# Patient Record
Sex: FEMALE | Race: BLACK OR AFRICAN AMERICAN | NOT HISPANIC OR LATINO | Employment: FULL TIME | URBAN - METROPOLITAN AREA
[De-identification: names, ages, dates, MRNs, and addresses within clinical notes are randomized per-mention and may not be internally consistent; named-entity substitution may affect disease eponyms.]

---

## 2018-02-19 PROBLEM — N18.30 STAGE 3 CHRONIC KIDNEY DISEASE: Status: ACTIVE | Noted: 2018-02-19

## 2018-02-19 PROBLEM — F31.70 BIPOLAR AFFECTIVE DISORDER IN REMISSION: Status: ACTIVE | Noted: 2018-02-19

## 2018-02-19 PROBLEM — E88.810 DYSMETABOLIC SYNDROME X: Status: ACTIVE | Noted: 2018-02-19

## 2018-02-19 PROBLEM — E78.49 OTHER HYPERLIPIDEMIA: Status: ACTIVE | Noted: 2018-02-19

## 2018-02-19 PROBLEM — E55.9 VITAMIN D DEFICIENCY DISEASE: Status: ACTIVE | Noted: 2018-02-19

## 2018-09-21 PROBLEM — R10.13 EPIGASTRIC PAIN: Status: ACTIVE | Noted: 2018-09-21

## 2020-07-21 PROBLEM — I10 ESSENTIAL HYPERTENSION: Status: ACTIVE | Noted: 2020-07-21

## 2020-10-28 PROBLEM — R94.31 ABNORMAL EKG: Status: ACTIVE | Noted: 2020-10-28

## 2021-04-29 ENCOUNTER — PATIENT MESSAGE (OUTPATIENT)
Dept: RESEARCH | Facility: HOSPITAL | Age: 46
End: 2021-04-29

## 2022-06-08 ENCOUNTER — TELEPHONE (OUTPATIENT)
Dept: PREADMISSION TESTING | Facility: HOSPITAL | Age: 47
End: 2022-06-08
Payer: COMMERCIAL

## 2022-06-08 NOTE — TELEPHONE ENCOUNTER
PAT call completed.  Patient educated on procedure instructions.  Medical history discussed and patient informed of arrival time of 9:00 AM on Saturday, June 11, 2022 at the [place], and was made aware of the limited-visitor policy, and that  is to remain during the entire visit.  All questions and concerns addressed.  Endoscopy instructions reviewed. Instructed no solid food, only clear liquids, the day before the procedure, then at 6 PM, the day before the procedure, drink the first half of the bowel prep, then at 2 AM, the morning of procedure, drink the second half of the prep, then do not eat or drink anything until after the procedure.   Pre-procedure covid testing not needed, patient is covid vaccinated. Patient verbalized understanding of all instructions.

## 2022-06-16 DIAGNOSIS — Z12.11 COLON CANCER SCREENING: Primary | ICD-10-CM

## 2022-12-09 PROBLEM — R73.02 IGT (IMPAIRED GLUCOSE TOLERANCE): Status: ACTIVE | Noted: 2022-12-09

## 2022-12-27 DIAGNOSIS — I10 ESSENTIAL HYPERTENSION: Primary | ICD-10-CM

## 2022-12-27 DIAGNOSIS — E78.49 OTHER HYPERLIPIDEMIA: ICD-10-CM

## 2022-12-27 DIAGNOSIS — R94.31 ABNORMAL EKG: ICD-10-CM

## 2022-12-28 ENCOUNTER — HOSPITAL ENCOUNTER (OUTPATIENT)
Dept: CARDIOLOGY | Facility: HOSPITAL | Age: 47
Discharge: HOME | End: 2022-12-28
Attending: STUDENT IN AN ORGANIZED HEALTH CARE EDUCATION/TRAINING PROGRAM
Payer: COMMERCIAL

## 2022-12-28 ENCOUNTER — OFFICE VISIT (OUTPATIENT)
Dept: CARDIOLOGY | Facility: CLINIC | Age: 47
End: 2022-12-28
Payer: COMMERCIAL

## 2022-12-28 VITALS
HEART RATE: 70 BPM | SYSTOLIC BLOOD PRESSURE: 133 MMHG | DIASTOLIC BLOOD PRESSURE: 90 MMHG | OXYGEN SATURATION: 97 % | WEIGHT: 157.63 LBS | BODY MASS INDEX: 29.01 KG/M2 | HEIGHT: 62 IN

## 2022-12-28 DIAGNOSIS — E78.49 OTHER HYPERLIPIDEMIA: ICD-10-CM

## 2022-12-28 DIAGNOSIS — F31.70 BIPOLAR AFFECTIVE DISORDER IN REMISSION: Primary | ICD-10-CM

## 2022-12-28 DIAGNOSIS — I10 ESSENTIAL HYPERTENSION: ICD-10-CM

## 2022-12-28 DIAGNOSIS — R94.31 ABNORMAL EKG: ICD-10-CM

## 2022-12-28 DIAGNOSIS — R94.31 NONSPECIFIC ABNORMAL ELECTROCARDIOGRAM (ECG) (EKG): ICD-10-CM

## 2022-12-28 DIAGNOSIS — E88.810 DYSMETABOLIC SYNDROME X: ICD-10-CM

## 2022-12-28 DIAGNOSIS — N18.31 STAGE 3A CHRONIC KIDNEY DISEASE: ICD-10-CM

## 2022-12-28 DIAGNOSIS — E55.9 VITAMIN D DEFICIENCY DISEASE: ICD-10-CM

## 2022-12-28 PROCEDURE — 3075F SYST BP GE 130 - 139MM HG: CPT | Mod: CPTII,S$GLB,, | Performed by: STUDENT IN AN ORGANIZED HEALTH CARE EDUCATION/TRAINING PROGRAM

## 2022-12-28 PROCEDURE — 99999 PR PBB SHADOW E&M-EST. PATIENT-LVL IV: CPT | Mod: PBBFAC,,, | Performed by: STUDENT IN AN ORGANIZED HEALTH CARE EDUCATION/TRAINING PROGRAM

## 2022-12-28 PROCEDURE — 99204 PR OFFICE/OUTPT VISIT, NEW, LEVL IV, 45-59 MIN: ICD-10-PCS | Mod: S$GLB,,, | Performed by: STUDENT IN AN ORGANIZED HEALTH CARE EDUCATION/TRAINING PROGRAM

## 2022-12-28 PROCEDURE — 99204 OFFICE O/P NEW MOD 45 MIN: CPT | Mod: S$GLB,,, | Performed by: STUDENT IN AN ORGANIZED HEALTH CARE EDUCATION/TRAINING PROGRAM

## 2022-12-28 PROCEDURE — 3080F DIAST BP >= 90 MM HG: CPT | Mod: CPTII,S$GLB,, | Performed by: STUDENT IN AN ORGANIZED HEALTH CARE EDUCATION/TRAINING PROGRAM

## 2022-12-28 PROCEDURE — 93010 ELECTROCARDIOGRAM REPORT: CPT | Mod: ,,, | Performed by: INTERNAL MEDICINE

## 2022-12-28 PROCEDURE — 3044F PR MOST RECENT HEMOGLOBIN A1C LEVEL <7.0%: ICD-10-PCS | Mod: CPTII,S$GLB,, | Performed by: STUDENT IN AN ORGANIZED HEALTH CARE EDUCATION/TRAINING PROGRAM

## 2022-12-28 PROCEDURE — 3066F NEPHROPATHY DOC TX: CPT | Mod: CPTII,S$GLB,, | Performed by: STUDENT IN AN ORGANIZED HEALTH CARE EDUCATION/TRAINING PROGRAM

## 2022-12-28 PROCEDURE — 3008F BODY MASS INDEX DOCD: CPT | Mod: CPTII,S$GLB,, | Performed by: STUDENT IN AN ORGANIZED HEALTH CARE EDUCATION/TRAINING PROGRAM

## 2022-12-28 PROCEDURE — 99999 PR PBB SHADOW E&M-EST. PATIENT-LVL IV: ICD-10-PCS | Mod: PBBFAC,,, | Performed by: STUDENT IN AN ORGANIZED HEALTH CARE EDUCATION/TRAINING PROGRAM

## 2022-12-28 PROCEDURE — 3075F PR MOST RECENT SYSTOLIC BLOOD PRESS GE 130-139MM HG: ICD-10-PCS | Mod: CPTII,S$GLB,, | Performed by: STUDENT IN AN ORGANIZED HEALTH CARE EDUCATION/TRAINING PROGRAM

## 2022-12-28 PROCEDURE — 3080F PR MOST RECENT DIASTOLIC BLOOD PRESSURE >= 90 MM HG: ICD-10-PCS | Mod: CPTII,S$GLB,, | Performed by: STUDENT IN AN ORGANIZED HEALTH CARE EDUCATION/TRAINING PROGRAM

## 2022-12-28 PROCEDURE — 1159F MED LIST DOCD IN RCRD: CPT | Mod: CPTII,S$GLB,, | Performed by: STUDENT IN AN ORGANIZED HEALTH CARE EDUCATION/TRAINING PROGRAM

## 2022-12-28 PROCEDURE — 3044F HG A1C LEVEL LT 7.0%: CPT | Mod: CPTII,S$GLB,, | Performed by: STUDENT IN AN ORGANIZED HEALTH CARE EDUCATION/TRAINING PROGRAM

## 2022-12-28 PROCEDURE — 3061F PR NEG MICROALBUMINURIA RESULT DOCUMENTED/REVIEW: ICD-10-PCS | Mod: CPTII,S$GLB,, | Performed by: STUDENT IN AN ORGANIZED HEALTH CARE EDUCATION/TRAINING PROGRAM

## 2022-12-28 PROCEDURE — 3008F PR BODY MASS INDEX (BMI) DOCUMENTED: ICD-10-PCS | Mod: CPTII,S$GLB,, | Performed by: STUDENT IN AN ORGANIZED HEALTH CARE EDUCATION/TRAINING PROGRAM

## 2022-12-28 PROCEDURE — 3061F NEG MICROALBUMINURIA REV: CPT | Mod: CPTII,S$GLB,, | Performed by: STUDENT IN AN ORGANIZED HEALTH CARE EDUCATION/TRAINING PROGRAM

## 2022-12-28 PROCEDURE — 93005 ELECTROCARDIOGRAM TRACING: CPT

## 2022-12-28 PROCEDURE — 1159F PR MEDICATION LIST DOCUMENTED IN MEDICAL RECORD: ICD-10-PCS | Mod: CPTII,S$GLB,, | Performed by: STUDENT IN AN ORGANIZED HEALTH CARE EDUCATION/TRAINING PROGRAM

## 2022-12-28 PROCEDURE — 3066F PR DOCUMENTATION OF TREATMENT FOR NEPHROPATHY: ICD-10-PCS | Mod: CPTII,S$GLB,, | Performed by: STUDENT IN AN ORGANIZED HEALTH CARE EDUCATION/TRAINING PROGRAM

## 2022-12-28 PROCEDURE — 93010 EKG 12-LEAD: ICD-10-PCS | Mod: ,,, | Performed by: INTERNAL MEDICINE

## 2022-12-28 NOTE — PROGRESS NOTES
Section of Cardiology                  Cardiac Clinic Note    Chief Complaint/Reason for consultation:  Abnormal EKG      HPI:   Treva Headley is a 47 y.o. female with h/o bipolar disorder, CKD, hypertension, prediabetes, vitamin-D deficiency who was referred to cardiology clinic by PCP  For evaluation.      12/28/22  Comes in to get evaluated  Denies SOB  Had chest pain in the past, had to wear a heat monitor  Was not diagnosed with anything  Does try to exercise about 2-3 times a week, 20-30 minutes, no limitations with this   BP elevated today- normotensive   Reports having a cold/URI today   Has been coughing since 12/12- taking OTC meds, no COVID    Denies tobacco abuse  ETOH occ     Family history: maternal gm- CHF; parents-HTN    Denies syncope, PND, LH, LE swelling         EKG 12/28/22 SB, nonspecific ST abn    ECHO      STRESS TEST    LHC      ROS: All 10 systems reviewed. Please refer to the HPI for pertinent positives. All other systems negative.     Past Medical History  Past Medical History:   Diagnosis Date    Bipolar 1 disorder     Chronic kidney disease (CKD), stage III (moderate)     Encounter for general adult medical examination without abnormal findings 02/15/2017    Hypertension     Insulin resistance        Surgical History  Past Surgical History:   Procedure Laterality Date    COLONOSCOPY N/A 6/11/2022    Procedure: COLONOSCOPY;  Surgeon: Ashley Jeffrey MD;  Location: [facility];  Service: Endoscopy;  Laterality: N/A;    ENDOMETRIAL ABLATION  2015    TUBAL LIGATION  2014          Allergies:   Review of patient's allergies indicates:  No Known Allergies    Social History:  Social History     Socioeconomic History    Marital status:    Tobacco Use    Smoking status: Never    Smokeless tobacco: Never   Substance and Sexual Activity    Alcohol use: Not Currently     Comment: occasionally    Drug use: No    Sexual activity: Yes     Partners: Male       Family  "History:  family history includes Cancer in her maternal aunt; Diabetes in her mother; Hypertension in her mother.    Home Medications:  Current Outpatient Medications on File Prior to Visit   Medication Sig Dispense Refill    atorvastatin (LIPITOR) 40 MG tablet Take 1 tablet (40 mg total) by mouth every evening. 90 tablet 1    benztropine (COGENTIN) 0.5 MG tablet Take 1 tablet by mouth 2 (two) times daily.      cholecalciferol, vitamin D3, 1,250 mcg (50,000 unit) capsule Take 1 capsule (50,000 Units total) by mouth every 7 days. 12 capsule 1    metoprolol succinate (TOPROL-XL) 100 MG 24 hr tablet Take 1 tablet (100 mg total) by mouth once daily. 90 tablet 1    tirzepatide (MOUNJARO) 2.5 mg/0.5 mL PnIj Inject 2.5 mg into the skin every 7 days. 4 pen 3    topiramate (TOPAMAX) 100 MG tablet 2 1/2 tabs by mouth at night 75 tablet 0    ziprasidone (GEODON) 40 MG Cap Take 1 capsule (40 mg total) by mouth once daily. 30 capsule 0    ziprasidone (GEODON) 80 MG capsule Take 1 capsule (80 mg total) by mouth 2 (two) times daily with meals. 60 capsule 0     No current facility-administered medications on file prior to visit.       Physical exam:  BP (!) 133/90   Pulse 70   Ht 5' 2" (1.575 m)   Wt 71.5 kg (157 lb 10.1 oz)   SpO2 97%   BMI 28.83 kg/m²         General: Pt is a 47 y.o. year old female who is AAOx3, in NAD, is pleasant, well nourished, looks stated age  HEENT: PERRL, EOMI, Oral mucosa pink & moist  CVS  No abnormal cardiac pulsations noted on inspection. JVP not raised. The apical impulse is normal on palpation, and is located in the left 5th intercostal space in the mid - clavicular line. No palpable thrills or abnormal pulsations noted. RR, S1 - S2 heard, no murmurs, rubs or gallops appreciated.   PUL : CTA B/L. No wheezes/crackles heard   ABD : BS +, soft. No tenderness elicited   LE : No C/C/E. Distal Pulses palpable B/L         LABS:    Chemistry:   Lab Results   Component Value Date     12/09/2022 "    K 3.9 12/09/2022     (H) 12/09/2022    CO2 21 12/09/2022    BUN 16 12/09/2022    CREATININE 1.71 (H) 12/09/2022    CALCIUM 9.1 12/09/2022     Cardiac Markers:   Lab Results   Component Value Date    TROPONINI 0.011 10/19/2015     Cardiac Markers (Last 3):   Lab Results   Component Value Date    TROPONINI 0.011 10/19/2015    TROPONINI <0.006 10/18/2015     CBC:   Lab Results   Component Value Date    WBC 3.7 12/09/2022    WBC 5.58 10/18/2015    HGB 13.4 12/09/2022    HGB 12.7 10/18/2015    HCT 41.6 12/09/2022    HCT 37.4 10/18/2015    MCV 96 12/09/2022    MCV 90 10/18/2015     12/09/2022     10/18/2015     Lipids:   Lab Results   Component Value Date    CHOL 133 12/09/2022    TRIG 47 12/09/2022    HDL 63 12/09/2022     Coagulation:   Lab Results   Component Value Date    INR 1.0 10/18/2015           Assessment        1. Bipolar affective disorder in remission    2. Abnormal EKG    3. Other hyperlipidemia    4. Essential hypertension    5. Stage 3a chronic kidney disease    6. Vitamin D deficiency disease    7. Dysmetabolic syndrome X    8. Nonspecific abnormal electrocardiogram (ECG) (EKG)         Plan:    Nonspecific abnormal EKG   Asymptomatic   Nonspecific ST abnormality on EKG, no workup needed    CKD   Stable     Hypertension  Elevated today due to cold, normotensive on prior visits  Continue Toprol XL    HLD  LDL 59 as of 12/22   Continue statin     Bipolar   Stable   Currently taking Geodon, Topamax           This note was prepared using voice recognition system and is likely to have sound alike errors that may have been overlooked even after proofreading.     I have reviewed all pertinent chart information.  Plans and recommendations have been formulated under my direct supervision. All questions answered and patient voiced understanding.   If symptoms persist go to the ED.    RTC prn        Cheri Norton MD  Cardiology

## 2022-12-29 ENCOUNTER — OFFICE VISIT (OUTPATIENT)
Dept: FAMILY MEDICINE | Facility: CLINIC | Age: 47
End: 2022-12-29
Payer: COMMERCIAL

## 2022-12-29 DIAGNOSIS — J40 BRONCHITIS: Primary | ICD-10-CM

## 2022-12-29 PROCEDURE — 99203 OFFICE O/P NEW LOW 30 MIN: CPT | Mod: 95,,, | Performed by: INTERNAL MEDICINE

## 2022-12-29 PROCEDURE — 3044F PR MOST RECENT HEMOGLOBIN A1C LEVEL <7.0%: ICD-10-PCS | Mod: CPTII,95,, | Performed by: INTERNAL MEDICINE

## 2022-12-29 PROCEDURE — 99203 PR OFFICE/OUTPT VISIT, NEW, LEVL III, 30-44 MIN: ICD-10-PCS | Mod: 95,,, | Performed by: INTERNAL MEDICINE

## 2022-12-29 PROCEDURE — 3061F NEG MICROALBUMINURIA REV: CPT | Mod: CPTII,95,, | Performed by: INTERNAL MEDICINE

## 2022-12-29 PROCEDURE — 3066F NEPHROPATHY DOC TX: CPT | Mod: CPTII,95,, | Performed by: INTERNAL MEDICINE

## 2022-12-29 PROCEDURE — 3061F PR NEG MICROALBUMINURIA RESULT DOCUMENTED/REVIEW: ICD-10-PCS | Mod: CPTII,95,, | Performed by: INTERNAL MEDICINE

## 2022-12-29 PROCEDURE — 3066F PR DOCUMENTATION OF TREATMENT FOR NEPHROPATHY: ICD-10-PCS | Mod: CPTII,95,, | Performed by: INTERNAL MEDICINE

## 2022-12-29 PROCEDURE — 3044F HG A1C LEVEL LT 7.0%: CPT | Mod: CPTII,95,, | Performed by: INTERNAL MEDICINE

## 2022-12-29 RX ORDER — PROMETHAZINE HYDROCHLORIDE AND DEXTROMETHORPHAN HYDROBROMIDE 6.25; 15 MG/5ML; MG/5ML
5 SYRUP ORAL EVERY 8 HOURS PRN
Qty: 240 ML | Refills: 0 | Status: SHIPPED | OUTPATIENT
Start: 2022-12-29 | End: 2023-01-08

## 2022-12-29 RX ORDER — PREDNISONE 20 MG/1
20 TABLET ORAL DAILY
Qty: 3 TABLET | Refills: 0 | Status: SHIPPED | OUTPATIENT
Start: 2022-12-29 | End: 2023-01-01

## 2022-12-29 NOTE — PROGRESS NOTES
Subjective:       Patient ID: Treva Headley is a 47 y.o. female.    Chief Complaint: Cough    The patient [place] is: [place].  The chief complaint leading to consultation is: cough  Visit type: Virtual visit with synchronous audio and video  Total time spent with patient: 6 minutes  Each patient to whom he or she provides medical services by telemedicine is:  (1) informed of the relationship between the physician and patient and the respective role of any other health care provider with respect to management of the patient; and (2) notified that he or she may decline to receive medical services by telemedicine and may withdraw from such care at any time.    Notes: 3 weeks-------cough------primarily dry-------no fever or chills.      Had cxr at start of symptoms ---clear--------no chest pain or SOB----------negative covid---------        Cough  This is a new problem. The current episode started 1 to 4 weeks ago. The problem has been gradually improving. The problem occurs hourly. The cough is Productive of purulent sputum. Associated symptoms include chest pain, nasal congestion, rhinorrhea and a sore throat. Pertinent negatives include no chills, ear congestion, ear pain, fever, headaches, heartburn, hemoptysis, myalgias, postnasal drip, rash, shortness of breath, sweats, weight loss or wheezing. The symptoms are aggravated by cold air. She has tried OTC cough suppressant, body position changes, prescription cough suppressant, rest and steroid inhaler for the symptoms. The treatment provided moderate relief. There is no history of asthma, bronchiectasis, bronchitis, COPD, emphysema, environmental allergies or pneumonia.   Past Medical History:   Diagnosis Date    Bipolar 1 disorder     Chronic kidney disease (CKD), stage III (moderate)     Encounter for general adult medical examination without abnormal findings 02/15/2017    Hypertension     Insulin resistance      Past Surgical History:   Procedure  Laterality Date    COLONOSCOPY N/A 6/11/2022    Procedure: COLONOSCOPY;  Surgeon: Ashley Jeffrey MD;  Location: [facility];  Service: Endoscopy;  Laterality: N/A;    ENDOMETRIAL ABLATION  2015    TUBAL LIGATION  2014     Family History   Problem Relation Age of Onset    Diabetes Mother     Hypertension Mother     Cancer Maternal Aunt      Social History     Socioeconomic History    Marital status:    Tobacco Use    Smoking status: Never    Smokeless tobacco: Never   Substance and Sexual Activity    Alcohol use: Not Currently     Comment: occasionally    Drug use: No    Sexual activity: Yes     Partners: Male     Review of Systems   Constitutional:  Negative for chills, fever and weight loss.   HENT:  Positive for rhinorrhea and sore throat. Negative for ear pain and postnasal drip.    Respiratory:  Positive for cough. Negative for hemoptysis, shortness of breath and wheezing.    Cardiovascular:  Positive for chest pain.   Gastrointestinal:  Negative for heartburn.   Musculoskeletal:  Negative for myalgias.   Skin:  Negative for rash.   Allergic/Immunologic: Negative for environmental allergies.   Neurological:  Negative for headaches.     Objective:      Physical Exam  Constitutional:       Appearance: Normal appearance.   Neurological:      Mental Status: She is alert and oriented to person, place, and time.   Psychiatric:         Mood and Affect: Mood normal.         Behavior: Behavior normal.         Thought Content: Thought content normal.         Judgment: Judgment normal.       CMP  Sodium   Date Value Ref Range Status   12/09/2022 142 134 - 144 mmol/L Final     Potassium   Date Value Ref Range Status   12/09/2022 3.9 3.5 - 5.2 mmol/L Final     Chloride   Date Value Ref Range Status   12/09/2022 110 (H) 96 - 106 mmol/L Final     CO2   Date Value Ref Range Status   12/09/2022 21 20 - 29 mmol/L Final     Glucose   Date Value Ref Range Status   12/09/2022 77 70 - 99 mg/dL Final     BUN   Date Value Ref  Range Status   12/09/2022 16 6 - 24 mg/dL Final     Creatinine   Date Value Ref Range Status   12/09/2022 1.71 (H) 0.57 - 1.00 mg/dL Final     Calcium   Date Value Ref Range Status   12/09/2022 9.1 8.7 - 10.2 mg/dL Final     Albumin   Date Value Ref Range Status   12/09/2022 4.2 3.8 - 4.8 g/dL Final     Total Bilirubin   Date Value Ref Range Status   12/09/2022 0.3 0.0 - 1.2 mg/dL Final     AST   Date Value Ref Range Status   12/09/2022 27 0 - 40 IU/L Final     ALT   Date Value Ref Range Status   12/09/2022 29 0 - 32 IU/L Final     Anion Gap   Date Value Ref Range Status   10/18/2015 7 (L) 8 - 16 mmol/L Final     eGFR if    Date Value Ref Range Status   10/18/2015 40 (A) >60 mL/min/1.73 m^2 Final     eGFR if non    Date Value Ref Range Status   11/22/2021 36 (L) >59 mL/min/1.73 Final     Lab Results   Component Value Date    WBC 3.7 12/09/2022    HGB 13.4 12/09/2022    HCT 41.6 12/09/2022    MCV 96 12/09/2022     12/09/2022     Lab Results   Component Value Date    CHOL 133 12/09/2022     Lab Results   Component Value Date    HDL 63 12/09/2022     Lab Results   Component Value Date    LDLCALC 59 12/09/2022     Lab Results   Component Value Date    TRIG 47 12/09/2022     No results found for: CHOLHDL  Lab Results   Component Value Date    TSH 1.850 12/09/2022     Lab Results   Component Value Date    LABA1C 5.2% 09/07/2018    HGBA1C 5.1 12/09/2022     Assessment:       1. Bronchitis          Plan:   Bronchitis  -     predniSONE (DELTASONE) 20 MG tablet; Take 1 tablet (20 mg total) by mouth once daily. for 3 days  Dispense: 3 tablet; Refill: 0  -     promethazine-dextromethorphan (PROMETHAZINE-DM) 6.25-15 mg/5 mL Syrp; Take 5 mLs by mouth every 8 (eight) hours as needed.  Dispense: 240 mL; Refill: 0    F/u here or PCP if persists----------------